# Patient Record
Sex: MALE | Race: WHITE | NOT HISPANIC OR LATINO | Employment: PART TIME | URBAN - NONMETROPOLITAN AREA
[De-identification: names, ages, dates, MRNs, and addresses within clinical notes are randomized per-mention and may not be internally consistent; named-entity substitution may affect disease eponyms.]

---

## 2020-03-14 ENCOUNTER — HOSPITAL ENCOUNTER (EMERGENCY)
Facility: HOSPITAL | Age: 35
Discharge: HOME | End: 2020-03-14
Attending: EMERGENCY MEDICINE | Admitting: EMERGENCY MEDICINE

## 2020-03-14 VITALS
RESPIRATION RATE: 16 BRPM | DIASTOLIC BLOOD PRESSURE: 96 MMHG | SYSTOLIC BLOOD PRESSURE: 150 MMHG | TEMPERATURE: 96.8 F | HEART RATE: 73 BPM | OXYGEN SATURATION: 100 %

## 2020-03-14 DIAGNOSIS — I83.899 BLEEDING FROM VARICOSE VEIN: Primary | ICD-10-CM

## 2020-03-14 PROCEDURE — 99281 EMR DPT VST MAYX REQ PHY/QHP: CPT

## 2020-03-14 PROCEDURE — 99281 EMR DPT VST MAYX REQ PHY/QHP: CPT | Mod: Z6 | Performed by: EMERGENCY MEDICINE

## 2020-03-14 ASSESSMENT — ENCOUNTER SYMPTOMS
ABDOMINAL PAIN: 0
SHORTNESS OF BREATH: 0
FEVER: 0

## 2020-03-14 NOTE — ED PROVIDER NOTES
History     Chief Complaint   Patient presents with     Varicose Vein     to right lower leg. states started squirting blood in the shower today. also c/o pain. no blood thinners or medications. denies injury. no bleeding at this time. sore noted to lower leg      HPI  Elvis Carver is a 34 year old male who presented to the ED with concerns about bleeding from the right ankle from a varicose vein.  While showering this morning, he noted bleeding from varicose vein on the right ankle.  He applied some tissue paper and came to the ED.  It was cleaned and pressure dressing applied at triage.  No active bleeding by the time patient was being  evaluated at the room.  He is not on any blood thinners.    Allergies:  No Known Allergies    Problem List:    There are no active problems to display for this patient.       Past Medical History:    No past medical history on file.    Past Surgical History:    No past surgical history on file.    Family History:    No family history on file.    Social History:  Marital Status:  Single [1]  Social History     Tobacco Use     Smoking status: Not on file   Substance Use Topics     Alcohol use: Not on file     Drug use: Not on file        Medications:    No current outpatient medications on file.        Review of Systems   Constitutional: Negative for fever.   Respiratory: Negative for shortness of breath.    Cardiovascular: Negative for chest pain.   Gastrointestinal: Negative for abdominal pain.   All other systems reviewed and are negative.      Physical Exam   BP: 150/96  Pulse: 73  Resp: 16  SpO2: 100 %      Physical Exam  Vitals signs and nursing note reviewed.   Constitutional:       General: He is not in acute distress.     Appearance: He is well-developed. He is not diaphoretic.   HENT:      Head: Normocephalic and atraumatic.   Eyes:      Pupils: Pupils are equal, round, and reactive to light.   Cardiovascular:      Rate and Rhythm: Normal rate and regular rhythm.       Heart sounds: Normal heart sounds.   Pulmonary:      Effort: Pulmonary effort is normal. No respiratory distress.      Breath sounds: Normal breath sounds. No stridor.   Abdominal:      General: Bowel sounds are normal. There is no distension.      Tenderness: There is no abdominal tenderness.   Musculoskeletal:         General: No tenderness or deformity.        Feet:    Neurological:      Mental Status: He is alert and oriented to person, place, and time.      Cranial Nerves: No cranial nerve deficit.         ED Course   Evaluated and pressure dressing applied to the right ankle varicose vein.    Procedures      No results found for this or any previous visit (from the past 24 hour(s)).    Medications - No data to display    Assessments & Plan (with Medical Decision Making)   Bleeding varicose vein: Right ankle varicose vein that was bleeding but controlled with pressure dressing.  Refer to general surgery for definitive treatment.   consult also made to establish care with a PCP.  Advised to maintain the current pressure dressing on the area for the next 48 hours.  Discharged home stable condition.    I have reviewed the nursing notes.    I have reviewed the findings, diagnosis, plan and need for follow up with the patient.    New Prescriptions    No medications on file       Final diagnoses:   Bleeding from varicose vein       3/14/2020   [facility]     Berhane lAdana MD  03/14/20 1129       Berhane Aldana MD  03/23/20 9256

## 2020-03-14 NOTE — ED NOTES
"Pt ambulatory to ED room 5 with c/o right lower leg pain. Pt states he was in the shower when his leg started \"shooting blood.\" Superficial vein noted to right lower leg. Small wound also noted to leg. No bleeding at this time.   "

## 2020-03-16 ENCOUNTER — TELEPHONE (OUTPATIENT)
Dept: BEHAVIORAL HEALTH | Facility: OTHER | Age: 35
End: 2020-03-16

## 2020-10-26 ENCOUNTER — ALLIED HEALTH/NURSE VISIT (OUTPATIENT)
Dept: FAMILY MEDICINE | Facility: OTHER | Age: 35
End: 2020-10-26
Payer: OTHER GOVERNMENT

## 2020-10-26 DIAGNOSIS — R05.9 COUGH: Primary | ICD-10-CM

## 2020-10-26 PROCEDURE — U0003 INFECTIOUS AGENT DETECTION BY NUCLEIC ACID (DNA OR RNA); SEVERE ACUTE RESPIRATORY SYNDROME CORONAVIRUS 2 (SARS-COV-2) (CORONAVIRUS DISEASE [COVID-19]), AMPLIFIED PROBE TECHNIQUE, MAKING USE OF HIGH THROUGHPUT TECHNOLOGIES AS DESCRIBED BY CMS-2020-01-R: HCPCS | Mod: ZL

## 2020-10-26 PROCEDURE — C9803 HOPD COVID-19 SPEC COLLECT: HCPCS

## 2020-10-26 PROCEDURE — 99207 PR NO CHARGE NURSE ONLY: CPT

## 2020-10-26 NOTE — PROGRESS NOTES
Patient swabbed for COVID-19 testing.  For cough.  April Bradford LPN on 10/26/2020 at 12:16 PM

## 2020-10-27 LAB
SARS-COV-2 RNA SPEC QL NAA+PROBE: NOT DETECTED
SPECIMEN SOURCE: NORMAL

## 2020-11-20 ENCOUNTER — ALLIED HEALTH/NURSE VISIT (OUTPATIENT)
Dept: FAMILY MEDICINE | Facility: OTHER | Age: 35
End: 2020-11-20
Attending: FAMILY MEDICINE
Payer: MEDICAID

## 2020-11-20 DIAGNOSIS — Z20.822 COVID-19 RULED OUT: Primary | ICD-10-CM

## 2020-11-20 LAB
LABORATORY COMMENT REPORT: NORMAL
SARS-COV-2 RNA SPEC QL NAA+PROBE: NEGATIVE
SARS-COV-2 RNA SPEC QL NAA+PROBE: NORMAL
SPECIMEN SOURCE: NORMAL
SPECIMEN SOURCE: NORMAL

## 2020-11-20 PROCEDURE — 99207 PR NO CHARGE NURSE ONLY: CPT

## 2020-11-20 PROCEDURE — C9803 HOPD COVID-19 SPEC COLLECT: HCPCS

## 2020-11-20 PROCEDURE — 87635 SARS-COV-2 COVID-19 AMP PRB: CPT | Mod: ZL | Performed by: FAMILY MEDICINE

## 2020-11-20 NOTE — PROGRESS NOTES
Patient swabbed for COVID-19 testing.  April Bradford LPN on 11/20/2020 at 2:12 PM    Covid rule out

## 2020-12-20 ENCOUNTER — HEALTH MAINTENANCE LETTER (OUTPATIENT)
Age: 35
End: 2020-12-20

## 2021-05-14 ENCOUNTER — IMMUNIZATION (OUTPATIENT)
Dept: FAMILY MEDICINE | Facility: OTHER | Age: 36
End: 2021-05-14
Attending: FAMILY MEDICINE
Payer: COMMERCIAL

## 2021-05-14 PROCEDURE — 0001A PR COVID VAC PFIZER DIL RECON 30 MCG/0.3 ML IM: CPT

## 2021-05-14 PROCEDURE — 91300 PR COVID VAC PFIZER DIL RECON 30 MCG/0.3 ML IM: CPT

## 2021-06-04 ENCOUNTER — IMMUNIZATION (OUTPATIENT)
Dept: FAMILY MEDICINE | Facility: OTHER | Age: 36
End: 2021-06-04
Attending: FAMILY MEDICINE
Payer: COMMERCIAL

## 2021-06-04 PROCEDURE — 91300 PR COVID VAC PFIZER DIL RECON 30 MCG/0.3 ML IM: CPT

## 2021-10-03 ENCOUNTER — HEALTH MAINTENANCE LETTER (OUTPATIENT)
Age: 36
End: 2021-10-03

## 2021-11-05 ENCOUNTER — ALLIED HEALTH/NURSE VISIT (OUTPATIENT)
Dept: FAMILY MEDICINE | Facility: OTHER | Age: 36
End: 2021-11-05
Attending: FAMILY MEDICINE
Payer: COMMERCIAL

## 2021-11-05 DIAGNOSIS — Z20.822 COVID-19 RULED OUT: Primary | ICD-10-CM

## 2021-11-05 PROCEDURE — U0005 INFEC AGEN DETEC AMPLI PROBE: HCPCS | Mod: ZL

## 2021-11-05 PROCEDURE — C9803 HOPD COVID-19 SPEC COLLECT: HCPCS

## 2021-11-06 LAB — SARS-COV-2 RNA RESP QL NAA+PROBE: NEGATIVE

## 2021-11-09 ENCOUNTER — OFFICE VISIT (OUTPATIENT)
Dept: FAMILY MEDICINE | Facility: OTHER | Age: 36
End: 2021-11-09
Payer: COMMERCIAL

## 2021-11-09 VITALS
HEART RATE: 82 BPM | OXYGEN SATURATION: 98 % | DIASTOLIC BLOOD PRESSURE: 88 MMHG | WEIGHT: 315 LBS | RESPIRATION RATE: 20 BRPM | SYSTOLIC BLOOD PRESSURE: 138 MMHG | TEMPERATURE: 97.7 F

## 2021-11-09 DIAGNOSIS — J02.9 SORE THROAT: ICD-10-CM

## 2021-11-09 DIAGNOSIS — J06.9 VIRAL URI WITH COUGH: Primary | ICD-10-CM

## 2021-11-09 DIAGNOSIS — R05.9 COUGH: ICD-10-CM

## 2021-11-09 LAB — GROUP A STREP BY PCR: NOT DETECTED

## 2021-11-09 PROCEDURE — 87651 STREP A DNA AMP PROBE: CPT | Mod: ZL | Performed by: PHYSICIAN ASSISTANT

## 2021-11-09 PROCEDURE — G0463 HOSPITAL OUTPT CLINIC VISIT: HCPCS

## 2021-11-09 PROCEDURE — 99213 OFFICE O/P EST LOW 20 MIN: CPT | Performed by: PHYSICIAN ASSISTANT

## 2021-11-09 RX ORDER — ALBUTEROL SULFATE 90 UG/1
2 AEROSOL, METERED RESPIRATORY (INHALATION) EVERY 4 HOURS PRN
Qty: 18 G | Refills: 1 | Status: SHIPPED | OUTPATIENT
Start: 2021-11-09

## 2021-11-09 RX ORDER — BENZONATATE 200 MG/1
200 CAPSULE ORAL 3 TIMES DAILY PRN
Qty: 30 CAPSULE | Refills: 1 | Status: SHIPPED | OUTPATIENT
Start: 2021-11-09 | End: 2022-10-17

## 2021-11-09 ASSESSMENT — PAIN SCALES - GENERAL: PAINLEVEL: SEVERE PAIN (7)

## 2021-11-09 NOTE — NURSING NOTE
Chief Complaint   Patient presents with     Cough   patient is here with a cough and sore throat. Multiple negative covid tests lately. Patient reports being light headed after he smokes a cigarette. Patient reports burning in his lungs.    Initial /88   Pulse 82   Temp 97.7  F (36.5  C) (Tympanic)   Resp 20   Wt (!) 154.7 kg (341 lb)   SpO2 98%  There is no height or weight on file to calculate BMI.  Medication Reconciliation: complete    FOOD SECURITY SCREENING QUESTIONS  Hunger Vital Signs:  Within the past 12 months we worried whether our food would run out before we got money to buy more. Never  Within the past 12 months the food we bought just didn't last and we didn't have money to get more. Never      Advanced Care Directive Reviewed    Augustin Nicole LPN

## 2021-11-09 NOTE — PATIENT INSTRUCTIONS
May use symptomatic care with tylenol or ibuprofen. Sudafed or mucinex work well for congestion. May use cough syrup or cough drops.  Using a humidifier works well to break up the congestion. You can also sleep propped up on a couple pillows to decrease symptoms at night.    Please take tylenol as needed up to 4 times daily.  Treat symptomatically with warm salt water gargles.  Lozenges, Tylenol, Advil or Aleve as needed. Frequent swallows of cool liquid.  Oatmeal coats the throat and some patients find it soothes the pain. Encouraged warm teas or fluids with honey.     If you have sinus congestion -  Use a Neti Pot/sinus flush (Moris Med Sinus Rinse) 3 times daily to irrigate sinuses/mucosal tissue.     Monitor for any fevers or chills. Return in 7-10 days if not feeling better. Please call clinic with any questions or concerns. Return to clinic with change/worsening of symptoms.   Encouraged fluids and rest.    Call 9-1-1 or go to the emergency room if you:  Have trouble breathing   Are drooling because you cannot swallow your saliva   Have swelling of the neck or tongue   Cannot move your neck or have trouble opening your mouth      COVID-19 Quarantine information:   Regardless of if you have been tested or not for COVID-19, if you develop possible signs or symptoms of COVID-19 please follow the following guidelines for when to discontinue your quarantine:  -       You've had no fever--and no medicine that reduces fever--for 1 full day (24 hours), and    -       Your other symptoms have resolved (gotten better). For example, your cough or breathing has improved, and   -       At least 10 days have passed since your symptoms started    Patient who have symptoms (cough, fever, or shortness of breath), need to isolate for 10 days from when symptoms started OR 24 hours after fever resolves (without fever reducing medications) AND improvement of respiratory symptoms (whichever is longer).       Isolate yourself at home  (in own room/own bathroom if possible)    Do Not allow any visitors    Do Not go to work or school    Do Not go to Hinduism,  centers, shopping, or other public places.    Do Not shake hands.    Avoid close and intimate contact with others (hugging, kissing).    Follow CDC recommendations for household cleaning of frequently touched services.      After the initial 10 days, continue to isolate yourself from household members as much as possible. To continue decrease the risk of community spread and exposure, you and any members of your household should limit activities in public for 14 days after starting home isolation.      You can reference the following CDC link for helpful home isolation/care tips:  https://www.cdc.gov/coronavirus/2019-ncov/downloads/10Things.pdf     Protect Others:    Cover Your Mouth and Nose with a mask, disposable tissue or wash cloth to avoid spreading germs to others.    Wash your hands and face frequently with soap and water     Call Back If: Breathing difficulty develops or you become worse.     For more information about COVID19 and options for caring for yourself at home, please visit the CDC website at https://www.cdc.gov/coronavirus/2019-ncov/about/steps-when-sick.html  For more options for care at [facility], please visit our website at [email or web address]

## 2021-11-09 NOTE — LETTER
November 9, 2021      Elvis Carver  [address]  [facility] [place] [address]        To Whom It May Concern:    Elvis Carver was seen in our clinic. Please excuse from work from 11/5-11/14/2021. He may return to work once he is fever free for 24 hours and starting to feel better.  He was diagnosed with probable COVID-19 on 11/9/2021 with current symptoms and close ill family contact members.  He may return to work without restrictions.      Sincerely,        Amber Tobin PA-C

## 2021-11-09 NOTE — PROGRESS NOTES
Nursing Notes:   Augustin Nicole LPN  11/9/2021  9:49 AM  Signed  Chief Complaint   Patient presents with     Cough   patient is here with a cough and sore throat. Multiple negative covid tests lately. Patient reports being light headed after he smokes a cigarette. Patient reports burning in his lungs.    Initial /88   Pulse 82   Temp 97.7  F (36.5  C) (Tympanic)   Resp 20   Wt (!) 154.7 kg (341 lb)   SpO2 98%  There is no height or weight on file to calculate BMI.  Medication Reconciliation: complete    FOOD SECURITY SCREENING QUESTIONS  Hunger Vital Signs:  Within the past 12 months we worried whether our food would run out before we got money to buy more. Never  Within the past 12 months the food we bought just didn't last and we didn't have money to get more. Never      Advanced Care Directive Reviewed    Augustin Nicole LPN        HPI:    Elvis Carver is a 36 year old male who presents for coughing.  Patient states that he has a cough and sore throat.  He had a Covid-19 test completed on 11/5 that was negative.  He also had a rapid Covid-19 test completed in [place] yesterday that was negative.  He does state that he has multiple Balbir-19 exposures with his household.  5 out of 6 people have tested positive for Covid-19 in his house.  His children have had more stomach upset and diarrhea with the infection.  Patient reports that he is currently having a sore throat and cough.  Lightheaded after he smokes a cigarette.  He also notices a burning in his lungs after he smokes a cigarette.  Has been using Mucinex as needed for the cough which is helping.  Patient symptoms started on 11/5/2021.  He has had a little diarrhea and no appetite.  No ear pain or sinus pain.  Having headaches.  Runny nose comes and goes.  Having some gurgling in his upper chest with laying down.  His lungs burn when he smokes a cigarette.  Felt hot and cold the first night however none since then.  Possible change in taste  initially however none since then.  No change in smell.  Keeping food and fluids down.  No dehydration concerns.    Past Medical History:   Diagnosis Date     No pertinent past medical history        Past Surgical History:   Procedure Laterality Date     NO HISTORY OF SURGERY         History reviewed. No pertinent family history.    Social History     Tobacco Use     Smoking status: Current Every Day Smoker     Packs/day: 0.50     Smokeless tobacco: Never Used     Tobacco comment: smokes a pipe   Substance Use Topics     Alcohol use: Yes     Comment: rare       Current Outpatient Medications   Medication Sig Dispense Refill     albuterol (PROAIR HFA/PROVENTIL HFA/VENTOLIN HFA) 108 (90 Base) MCG/ACT inhaler Inhale 2 puffs into the lungs every 4 hours as needed for shortness of breath / dyspnea or wheezing 18 g 1     benzonatate (TESSALON) 200 MG capsule Take 1 capsule (200 mg) by mouth 3 times daily as needed for cough 30 capsule 1       No Known Allergies    REVIEW OF SYSTEMS:  Refer to HPI.    EXAM:   Vitals:    /88   Pulse 82   Temp 97.7  F (36.5  C) (Tympanic)   Resp 20   Wt (!) 154.7 kg (341 lb)   SpO2 98%     General Appearance: Pleasant, alert, appropriate appearance for age. No acute distress  Ear Exam: Normal TM's bilaterally, grey, translucent, bony landmarks appreciated.   Left/Right TM: Effusion is not present. TM is not bulging. There is no pus appreciated.    Normal auditory canals and external ears. Non-tender.  OroPharynx Exam:  Erythematous posterior pharynx with no exudates. No sinus pain upon palpation of frontal, ethmoid, and maxillary sinuses.  Chest/Respiratory Exam: Normal chest wall and respirations. Clear to auscultation.  No wheezing or crackling appreciated.  No retractions appreciated.  Cardiovascular Exam: Regular rate and rhythm. S1, S2, no murmur, click, gallop, or rubs.  Lymphatic Exam: ACLN.  Skin: no rash or abnormalities  Psychiatric Exam: Alert and oriented - appropriate  affect.    PHQ Depression Screen  No flowsheet data found.    LABS:    Results for orders placed or performed in visit on 11/09/21   Group A Streptococcus PCR Throat Swab     Status: Normal    Specimen: Throat; Swab   Result Value Ref Range    Group A strep by PCR Not Detected Not Detected    Narrative    The Xpert Xpress Strep A test, performed on the [employer], is a rapid, qualitative in vitro diagnostic test for the detection of Streptococcus pyogenes (Group A ß-hemolytic Streptococcus, Strep A) in throat swab specimens from patients with signs and symptoms of pharyngitis. The Xpert Xpress Strep A test can be used as an aid in the diagnosis of Group A Streptococcal pharyngitis. The assay is not intended to monitor treatment for Group A Streptococcus infections. The Xpert Xpress Strep A test utilizes an automated real-time polymerase chain reaction (PCR) to detect Streptococcus pyogenes DNA.         ASSESSMENT AND PLAN:      ICD-10-CM    1. Viral URI with cough  J06.9    2. Sore throat  J02.9 Group A Streptococcus PCR Throat Swab   3. Cough  R05.9 benzonatate (TESSALON) 200 MG capsule     albuterol (PROAIR HFA/PROVENTIL HFA/VENTOLIN HFA) 108 (90 Base) MCG/ACT inhaler     Completed rapid strep test which was negative.  Exam is stable.  Vital signs are in the normal range.  Symptoms are viral.  No antibiotics are warranted at this time.  Symptoms likely due to Covid-19 with multiple exposures in the household.  Gave warning signs and symptoms.  Encouraged increased fluids with electrolytes.  Patient was given Tessalon Perles and an albuterol inhaler to use as needed for symptomatic relief.  Patient was given a work note.  Gave patient education.  Recheck if symptoms are not coming down or worsening if needed.  Gave warning signs and symptoms.  Encourage quarantining for 10 to 14 days until symptoms are coming down.  Return to clinic or emergency room if symptoms are changing or worsening.  Highly  stressed the need to quit smoking.    Patient Instructions   May use symptomatic care with tylenol or ibuprofen. Sudafed or mucinex work well for congestion. May use cough syrup or cough drops.  Using a humidifier works well to break up the congestion. You can also sleep propped up on a couple pillows to decrease symptoms at night.    Please take tylenol as needed up to 4 times daily.  Treat symptomatically with warm salt water gargles.  Lozenges, Tylenol, Advil or Aleve as needed. Frequent swallows of cool liquid.  Oatmeal coats the throat and some patients find it soothes the pain. Encouraged warm teas or fluids with honey.     If you have sinus congestion -  Use a Neti Pot/sinus flush (Moris Med Sinus Rinse) 3 times daily to irrigate sinuses/mucosal tissue.     Monitor for any fevers or chills. Return in 7-10 days if not feeling better. Please call clinic with any questions or concerns. Return to clinic with change/worsening of symptoms.   Encouraged fluids and rest.    Call 9-1-1 or go to the emergency room if you:  Have trouble breathing   Are drooling because you cannot swallow your saliva   Have swelling of the neck or tongue   Cannot move your neck or have trouble opening your mouth      COVID-19 Quarantine information:   Regardless of if you have been tested or not for COVID-19, if you develop possible signs or symptoms of COVID-19 please follow the following guidelines for when to discontinue your quarantine:  -       You've had no fever--and no medicine that reduces fever--for 1 full day (24 hours), and    -       Your other symptoms have resolved (gotten better). For example, your cough or breathing has improved, and   -       At least 10 days have passed since your symptoms started    Patient who have symptoms (cough, fever, or shortness of breath), need to isolate for 10 days from when symptoms started OR 24 hours after fever resolves (without fever reducing medications) AND improvement of respiratory  symptoms (whichever is longer).       Isolate yourself at home (in own room/own bathroom if possible)    Do Not allow any visitors    Do Not go to work or school    Do Not go to Jehovah's witness,  centers, shopping, or other public places.    Do Not shake hands.    Avoid close and intimate contact with others (hugging, kissing).    Follow CDC recommendations for household cleaning of frequently touched services.      After the initial 10 days, continue to isolate yourself from household members as much as possible. To continue decrease the risk of community spread and exposure, you and any members of your household should limit activities in public for 14 days after starting home isolation.      You can reference the following CDC link for helpful home isolation/care tips:  https://www.cdc.gov/coronavirus/2019-ncov/downloads/10Things.pdf     Protect Others:    Cover Your Mouth and Nose with a mask, disposable tissue or wash cloth to avoid spreading germs to others.    Wash your hands and face frequently with soap and water     Call Back If: Breathing difficulty develops or you become worse.     For more information about COVID19 and options for caring for yourself at home, please visit the CDC website at https://www.cdc.gov/coronavirus/2019-ncov/about/steps-when-sick.html  For more options for care at [facility], please visit our website at [email or web address]           Amber Tobin PA-C ..................11/9/2021 9:50 AM

## 2021-11-28 ENCOUNTER — HEALTH MAINTENANCE LETTER (OUTPATIENT)
Age: 36
End: 2021-11-28

## 2022-01-23 ENCOUNTER — HEALTH MAINTENANCE LETTER (OUTPATIENT)
Age: 37
End: 2022-01-23

## 2022-01-24 ENCOUNTER — ALLIED HEALTH/NURSE VISIT (OUTPATIENT)
Dept: FAMILY MEDICINE | Facility: OTHER | Age: 37
End: 2022-01-24
Attending: FAMILY MEDICINE
Payer: COMMERCIAL

## 2022-01-24 DIAGNOSIS — Z20.822 EXPOSURE TO 2019 NOVEL CORONAVIRUS: Primary | ICD-10-CM

## 2022-01-24 PROCEDURE — C9803 HOPD COVID-19 SPEC COLLECT: HCPCS

## 2022-01-24 PROCEDURE — U0005 INFEC AGEN DETEC AMPLI PROBE: HCPCS | Mod: ZL

## 2022-01-24 NOTE — PROGRESS NOTES
Patient here for Covid Testing. Rule out- Exposure, no symptoms.    Irma Hayes MA on 1/24/2022 at 11:31 AM

## 2022-01-25 ENCOUNTER — MYC MEDICAL ADVICE (OUTPATIENT)
Dept: FAMILY MEDICINE | Facility: OTHER | Age: 37
End: 2022-01-25
Payer: COMMERCIAL

## 2022-01-25 LAB — SARS-COV-2 RNA RESP QL NAA+PROBE: DETECTED

## 2022-04-25 ENCOUNTER — HOSPITAL ENCOUNTER (EMERGENCY)
Facility: HOSPITAL | Age: 37
Discharge: HOME | End: 2022-04-25
Attending: NURSE PRACTITIONER | Admitting: NURSE PRACTITIONER
Payer: OTHER MISCELLANEOUS

## 2022-04-25 ENCOUNTER — APPOINTMENT (OUTPATIENT)
Dept: GENERAL RADIOLOGY | Facility: HOSPITAL | Age: 37
End: 2022-04-25
Attending: NURSE PRACTITIONER
Payer: OTHER MISCELLANEOUS

## 2022-04-25 VITALS
DIASTOLIC BLOOD PRESSURE: 82 MMHG | SYSTOLIC BLOOD PRESSURE: 112 MMHG | HEART RATE: 72 BPM | OXYGEN SATURATION: 97 % | RESPIRATION RATE: 20 BRPM | TEMPERATURE: 99 F

## 2022-04-25 DIAGNOSIS — S46.911A MUSCLE STRAIN OF RIGHT UPPER ARM, INITIAL ENCOUNTER: ICD-10-CM

## 2022-04-25 PROCEDURE — 73060 X-RAY EXAM OF HUMERUS: CPT | Mod: RT

## 2022-04-25 PROCEDURE — 99213 OFFICE O/P EST LOW 20 MIN: CPT | Performed by: NURSE PRACTITIONER

## 2022-04-25 PROCEDURE — G0463 HOSPITAL OUTPT CLINIC VISIT: HCPCS

## 2022-04-25 RX ORDER — HYDROCODONE BITARTRATE AND ACETAMINOPHEN 5; 325 MG/1; MG/1
1 TABLET ORAL EVERY 6 HOURS PRN
Qty: 18 TABLET | Refills: 0 | Status: SHIPPED | OUTPATIENT
Start: 2022-04-25 | End: 2022-04-28

## 2022-04-25 RX ORDER — IBUPROFEN 800 MG/1
800 TABLET, FILM COATED ORAL EVERY 8 HOURS PRN
Qty: 60 TABLET | Refills: 0 | Status: SHIPPED | OUTPATIENT
Start: 2022-04-25 | End: 2022-05-03

## 2022-04-25 ASSESSMENT — ENCOUNTER SYMPTOMS
NUMBNESS: 0
FEVER: 0
COUGH: 0
MYALGIAS: 1
ARTHRALGIAS: 1
PSYCHIATRIC NEGATIVE: 1

## 2022-04-25 NOTE — ED TRIAGE NOTES
Pt fell into a hole on a deck, catching himself using a railing with his right arm. Pain since catching himself.   Pt can move shoulder joint and lower arm pulse and color and temp WDL. Pt states it is painful to life up.   Ice offered and denied

## 2022-04-25 NOTE — DISCHARGE INSTRUCTIONS
1.Ace bandage to right upper arm for support. Rest arm til seen by Ortho    2. Sling to arm for support til seen by ortho    3. Lortab   5/325mg  1 tab every 4-6 hours  for worst pain..  Ibuprofen 800mg  3 times a day  with food.   4. Referral to [facility]    Call their office for appt.  [phone]

## 2022-04-25 NOTE — Clinical Note
Elvis Carver was seen and treated in our emergency department on 4/25/2022.  He may return to work on 05/09/2022.  Until seen by Ortho       If you have any questions or concerns, please don't hesitate to call.      Warner Rivas, NP

## 2022-04-25 NOTE — ED PROVIDER NOTES
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 Felt pop to back of right upper arm. Pain in posterior upper arm   to straighten out right lower arm.Can move upper arm out somewhat. Shoulder itself   does not hurt.    No obvious lumps or mass noted.  No numbness in hand  Or fingers.      Allergies:  No Known Allergies    Problem List:    There are no problems to display for this patient.       Past Medical History:    Past Medical History:   Diagnosis Date     No pertinent past medical history        Past Surgical History:    Past Surgical History:   Procedure Laterality Date     NO HISTORY OF SURGERY         Family History:    History reviewed. No pertinent family history.    Social History:  Marital Status:  Single [1]  Social History     Tobacco Use     Smoking status: Current Every Day Smoker     Packs/day: 0.50     Smokeless tobacco: Never Used     Tobacco comment: smokes a pipe   Vaping Use     Vaping Use: Never used   Substance Use Topics     Alcohol use: Yes     Comment: rare     Drug use: Yes     Types: Marijuana        Medications:    albuterol (PROAIR HFA/PROVENTIL HFA/VENTOLIN HFA) 108 (90 Base) MCG/ACT inhaler  benzonatate (TESSALON) 200 MG capsule  HYDROcodone-acetaminophen (NORCO) 5-325 MG tablet  ibuprofen (ADVIL/MOTRIN) 800 MG tablet          Review of Systems   Constitutional: Negative for fever.   Respiratory: Negative for cough.    Musculoskeletal: Positive for arthralgias and myalgias.        Pain in posterior upper arm, son when bends lower arm ,     Skin: Negative for rash.   Neurological: Negative for numbness.   Psychiatric/Behavioral: Negative.        Physical Exam   BP: 112/82  Pulse: 72  Temp: 99  F (37.2  C)  Resp: 20  SpO2: 97 %      Physical Exam  Constitutional:       Comments: Has pain in upper arm      Musculoskeletal:         General: Tenderness present. No swelling or deformity.      Comments: Pain with straightening out lower arm in Upper posterior arm.  No bulge noted.  Radial pulse and CMS intact to fingers.  Has to use left arm to lift right arm.     Skin:     General: Skin is warm and dry.      Capillary Refill: Capillary refill takes less than 2 seconds.      Findings: No bruising or erythema.   Neurological:      General: No focal deficit present.      Mental Status: He is alert and oriented to person, place, and time.      Sensory: No sensory deficit.      Motor: Weakness present.      Comments: CMS intact.     Psychiatric:         Mood and Affect: Mood normal.         Behavior: Behavior normal.         ED Course                 Procedures       Ace to upper arm      Splint placed.             Results for orders placed or performed during the hospital encounter of 04/25/22 (from the past 24 hour(s))   Humerus XR, G/E 2 views, right    Narrative    Exam: XR HUMERUS RIGHT G/E 2 VIEWS     History:Male, age 36 years, Pt fell on shoulder and landed on a  railing    Comparison:  None    Technique: Two views are submitted    Findings: Bones are normally mineralized. No evidence of acute or  subacute fracture.  No evidence of dislocation.           Impression    Impression:  No evidence of acute or subacute bony abnormality.    KERLINE CRAWFORD MD         SYSTEM ID:  [id number]       Medications - No data to display    Assessments & Plan (with Medical Decision Making)     I have reviewed the nursing notes.    I have reviewed the findings, diagnosis, plan and need for follow up with the patient.  Concern re:  Pulled bicep off mooring??    New Prescriptions    HYDROCODONE-ACETAMINOPHEN (NORCO) 5-325 MG TABLET    Take 1 tablet by mouth every 6 hours as needed for pain    IBUPROFEN (ADVIL/MOTRIN) 800 MG TABLET    Take 1 tablet (800 mg) by mouth every 8 hours as needed for moderate pain       Final diagnoses:    Muscle strain of right upper arm, initial encounter   ASSESSMENT / PLAN:  (S44.859S) Muscle strain of right upper arm, initial encounter  Comment:   Plan:   Concern re:  Pulled bicep off mooring??    1.Ace bandage to right upper arm for support. Rest arm til seen by Ortho    2. Sling to arm for support til seen by ortho    3. Lortab   5/325mg  1 tab every 4-6 hours  for worst pain..  Ibuprofen 800mg  3 times a day  with food.   4. Referral to [facility]    Call their office for appt.  [phone]   4/25/2022   [facility]     Warner Rivas, TONYA  04/25/22 1631       Warner Rivas NP  04/25/22 1705

## 2022-04-25 NOTE — ED TRIAGE NOTES
Pt presents with right shoulder pain from fall. Pt has limited ROM with shoulder. Pt rates pain 4/10 with movement.

## 2022-08-08 ENCOUNTER — APPOINTMENT (OUTPATIENT)
Dept: OCCUPATIONAL MEDICINE | Facility: OTHER | Age: 37
End: 2022-08-08

## 2022-08-08 PROCEDURE — 99000 SPECIMEN HANDLING OFFICE-LAB: CPT

## 2022-09-04 ENCOUNTER — HEALTH MAINTENANCE LETTER (OUTPATIENT)
Age: 37
End: 2022-09-04

## 2022-10-17 ENCOUNTER — OFFICE VISIT (OUTPATIENT)
Dept: FAMILY MEDICINE | Facility: OTHER | Age: 37
End: 2022-10-17
Attending: NURSE PRACTITIONER
Payer: COMMERCIAL

## 2022-10-17 ENCOUNTER — HOSPITAL ENCOUNTER (OUTPATIENT)
Dept: GENERAL RADIOLOGY | Facility: OTHER | Age: 37
Discharge: HOME | End: 2022-10-17
Attending: STUDENT IN AN ORGANIZED HEALTH CARE EDUCATION/TRAINING PROGRAM
Payer: COMMERCIAL

## 2022-10-17 VITALS
OXYGEN SATURATION: 98 % | WEIGHT: 315 LBS | SYSTOLIC BLOOD PRESSURE: 130 MMHG | RESPIRATION RATE: 20 BRPM | HEART RATE: 68 BPM | DIASTOLIC BLOOD PRESSURE: 82 MMHG | TEMPERATURE: 98.1 F

## 2022-10-17 DIAGNOSIS — R05.2 SUBACUTE COUGH: Primary | ICD-10-CM

## 2022-10-17 DIAGNOSIS — R05.2 SUBACUTE COUGH: ICD-10-CM

## 2022-10-17 DIAGNOSIS — J06.9 VIRAL URI WITH COUGH: ICD-10-CM

## 2022-10-17 PROCEDURE — 99213 OFFICE O/P EST LOW 20 MIN: CPT | Performed by: STUDENT IN AN ORGANIZED HEALTH CARE EDUCATION/TRAINING PROGRAM

## 2022-10-17 PROCEDURE — G0463 HOSPITAL OUTPT CLINIC VISIT: HCPCS

## 2022-10-17 PROCEDURE — 71046 X-RAY EXAM CHEST 2 VIEWS: CPT

## 2022-10-17 RX ORDER — BENZONATATE 200 MG/1
200 CAPSULE ORAL 3 TIMES DAILY PRN
Qty: 30 CAPSULE | Refills: 1 | Status: SHIPPED | OUTPATIENT
Start: 2022-10-17

## 2022-10-17 ASSESSMENT — PAIN SCALES - GENERAL: PAINLEVEL: NO PAIN (0)

## 2022-10-17 NOTE — PROGRESS NOTES
Chief Complaint   Patient presents with     URI     Symptoms for 3-weeks, coughing and can't sleep, congestion       Initial /82 (BP Location: Right arm, Patient Position: Sitting, Cuff Size: Adult Large)   Pulse 68   Temp 98.1  F (36.7  C) (Tympanic)   Resp 20   Wt 149.2 kg (329 lb)   SpO2 98%  There is no height or weight on file to calculate BMI.      Medication Reconciliation: complete    FOOD SECURITY SCREENING QUESTIONS:      The next two questions are to help us understand your food security.  If you are feeling you need any assistance in this area, we have resources available to support you today.    Hunger Vital Signs:  Within the past 12 months we worried whether our food would run out before we got money to buy more. Never  Within the past 12 months the food we bought just didn't last and we didn't have money to get more. Never    Renetta Chin LPN....................  10/17/2022   9:38 AM

## 2022-10-17 NOTE — PROGRESS NOTES
Mr. Carver is a 37 year old male who presents to the clinic for Cough symptoms.     HPI     Ongoing cough for 3 weeks.   Toddlers at home  No current fevers or chills.     Nonproductive.  Current active smoker.  Smokes approximately two thirds of a pack per day as well as a pipe.  Down from 1 pack/day.        Review of Systems     Reviewed and updated as needed this visit by Provider                   EXAM:   Vitals:    10/17/22 0937   BP: 130/82   BP Location: Right arm   Patient Position: Sitting   Cuff Size: Adult Large   Pulse: 68   Resp: 20   Temp: 98.1  F (36.7  C)   TempSrc: Tympanic   SpO2: 98%   Weight: 149.2 kg (329 lb)         BP Readings from Last 3 Encounters:   10/17/22 130/82   04/25/22 112/82   11/09/21 138/88      Wt Readings from Last 3 Encounters:   10/17/22 149.2 kg (329 lb)   11/09/21 (!) 154.7 kg (341 lb)      There is no height or weight on file to calculate BMI.     Physical Exam   General: Pleasant 37-year-old man sitting clinic no acute distress  Pulmonary: Occasional scattered inspiratory crackles resolved with several deep breaths    INVESTIGATIONS:  - Labs reviewed in Epic     ASSESSMENT AND PLAN:    ICD-10-CM    1. Subacute cough  R05.2 XR Chest 2 Views     benzonatate (TESSALON) 200 MG capsule      2. Cough  R05.9       3. Viral URI with cough  J06.9 benzonatate (TESSALON) 200 MG capsule          Assessment/Plan: Patient with ongoing dry nonproductive cough for 3 weeks after a viral illness and no other symptoms at this time.  Chest x-ray obtained which shows no pneumonia.  Recommend supportive cares with ibuprofen and Tylenol as needed as well as DayQuil and NyQuil for cough.  He was given a prescription for Tessalon Perles and instructed to use his inhaler that he has at home 2-3 times daily.        Electronically signed by:  Hany Farrell MD on 10/17/2022  Internal Medicine  [facility]

## 2022-12-25 NOTE — ED AVS SNAPSHOT
[facility]  [address] [place] [address]  Phone:  [phone]                                    Elvis Carver   MRN: [id number]    Department:  [facility]   Date of Visit:  3/14/2020           After Visit Summary Signature Page    I have received my discharge instructions, and my questions have been answered. I have discussed any challenges I see with this plan with the nurse or doctor.    ..........................................................................................................................................  Patient/Patient Representative Signature      ..........................................................................................................................................  Patient Representative Print Name and Relationship to Patient    ..................................................               ................................................  Date                                   Time    ..........................................................................................................................................  Reviewed by Signature/Title    ...................................................              ..............................................  Date                                               Time          22EPIC Rev 08/18        complains of pain/discomfort

## 2023-04-15 ENCOUNTER — HOSPITAL ENCOUNTER (OUTPATIENT)
Dept: GENERAL RADIOLOGY | Facility: OTHER | Age: 38
Discharge: HOME | End: 2023-04-15
Attending: NURSE PRACTITIONER
Payer: COMMERCIAL

## 2023-04-15 ENCOUNTER — OFFICE VISIT (OUTPATIENT)
Dept: FAMILY MEDICINE | Facility: OTHER | Age: 38
End: 2023-04-15
Payer: COMMERCIAL

## 2023-04-15 VITALS
RESPIRATION RATE: 12 BRPM | BODY MASS INDEX: 41.75 KG/M2 | WEIGHT: 315 LBS | TEMPERATURE: 97.4 F | SYSTOLIC BLOOD PRESSURE: 122 MMHG | DIASTOLIC BLOOD PRESSURE: 78 MMHG | OXYGEN SATURATION: 98 % | HEART RATE: 56 BPM | HEIGHT: 73 IN

## 2023-04-15 DIAGNOSIS — E66.01 MORBID OBESITY (H): ICD-10-CM

## 2023-04-15 DIAGNOSIS — M43.10 PARS DEFECT WITH SPONDYLOLISTHESIS: ICD-10-CM

## 2023-04-15 DIAGNOSIS — M54.50 ACUTE BILATERAL LOW BACK PAIN WITHOUT SCIATICA: Primary | ICD-10-CM

## 2023-04-15 PROCEDURE — 72100 X-RAY EXAM L-S SPINE 2/3 VWS: CPT

## 2023-04-15 PROCEDURE — 250N000011 HC RX IP 250 OP 636: Performed by: NURSE PRACTITIONER

## 2023-04-15 PROCEDURE — G0463 HOSPITAL OUTPT CLINIC VISIT: HCPCS | Mod: 25

## 2023-04-15 PROCEDURE — 99214 OFFICE O/P EST MOD 30 MIN: CPT | Performed by: NURSE PRACTITIONER

## 2023-04-15 PROCEDURE — G0463 HOSPITAL OUTPT CLINIC VISIT: HCPCS

## 2023-04-15 PROCEDURE — 96372 THER/PROPH/DIAG INJ SC/IM: CPT | Performed by: NURSE PRACTITIONER

## 2023-04-15 RX ORDER — METHOCARBAMOL 500 MG/1
500-1000 TABLET, FILM COATED ORAL 4 TIMES DAILY PRN
Qty: 270 TABLET | Refills: 3 | Status: SHIPPED | OUTPATIENT
Start: 2023-04-15

## 2023-04-15 RX ORDER — KETOROLAC TROMETHAMINE 30 MG/ML
60 INJECTION, SOLUTION INTRAMUSCULAR; INTRAVENOUS ONCE
Status: COMPLETED | OUTPATIENT
Start: 2023-04-15 | End: 2023-04-15

## 2023-04-15 RX ORDER — SENNOSIDES 8.6 MG
1300 CAPSULE ORAL EVERY 8 HOURS PRN
Qty: 270 TABLET | Refills: 1 | Status: SHIPPED | OUTPATIENT
Start: 2023-04-15

## 2023-04-15 RX ADMIN — KETOROLAC TROMETHAMINE 60 MG: 30 INJECTION, SOLUTION INTRAMUSCULAR at 13:17

## 2023-04-15 ASSESSMENT — PAIN SCALES - GENERAL: PAINLEVEL: SEVERE PAIN (6)

## 2023-04-15 NOTE — NURSING NOTE
Chief Complaint   Patient presents with     Back Pain     Low back   Patient woke up yesterday am with back pain and worsened as the day went on . Cannot relate to any activity or injury.    Patient treating with tylenol and ibuprofen  Used icy hot on back last night with some relief    Advanced Care Planning on file? no    Medication Review Completed: complete    FOOD SECURITY SCREENING QUESTIONS:    The next two questions are to help us understand your food security.  If you are feeling you need any assistance in this area, we have resources available to support you today.    Hunger Vital Signs:  Within the past 12 months we worried whether our food would run out before we got money to buy more. Never  Within the past 12 months the food we bought just didn't last and we didn't have money to get more. Never    Maryana Mariano LPN

## 2023-04-15 NOTE — LETTER
April 15, 2023      Elvis Carver  [address]  [facility] [facility] [address]        To Whom It May Concern:    Elvis Carver was seen in our clinic. He may return to work with the following: limited to light duty - lifting no greater than 10 pounds, no bending/stooping, no repetitive motions, and standing limited to 4 hours without a break until he has completed 4 sessions of Physical Therapy.    If unable to accommodate light duty restrictions, please notify me.    Sincerely,        CASI Dickerson, AGNP-C  Internal Medicine - Unit 3  04/15/2023 1:03 PM

## 2023-04-15 NOTE — PROGRESS NOTES
"HPI:    Elvis Carver is a 37 year old male who [facility] for low back pain. Patient woke up yesterday am with back pain and worsened as the day went on. He cannot relate to any activity or injury. He has no prior history of back injury. He works at [employer] as a . Patient treating with Tylenol 650 mg and ibuprofen 800 mg this morning. He used icy hot on back last night with some relief. He reports he had difficulty getting out of bed this morning.  Denies any changes with bowel/bladder.     ROS:  Refer to HPI    /78 (BP Location: Right arm, Patient Position: Sitting, Cuff Size: Adult Large)   Pulse 56   Temp 97.4  F (36.3  C) (Tympanic)   Resp 12   Ht 1.854 m (6' 1\")   Wt 148.8 kg (328 lb)   SpO2 98%   BMI 43.27 kg/m      EXAM:  General Appearance: Well appearing male, appropriate appearance for age. No acute distress  Respiratory: normal chest wall and respirations.  Normal effort.  Clear to auscultation bilaterally, no wheezing, crackles or rhonchi.  No increased work of breathing.  No cough appreciated.  Cardiac: RRR with no murmurs  Musculoskeletal:  Equal movement of bilateral upper extremities.  Equal movement of bilateral lower extremities.  Normal gait.  Unable to rotate side to side due to increased pain.  Dermatological: no rashes noted of exposed skin  Psychological: normal affect, alert, oriented, and pleasant.     Diagnostics:  Results for orders placed or performed in visit on 04/15/23   XR Lumbar Spine 2/3 Views     Status: None    Narrative    XR LUMBAR SPINE 2/3 VIEWS    HISTORY: Acute bilateral low back pain without sciatica .    COMPARISON: None.    TECHNIQUE: 3 views of the lumbosacral spine.    FINDINGS:    Bilateral L5 pars defects are present. There is 6 mm of grade 1  anterolisthesis of L5 on S1. Lumbar vertebral body heights are  maintained.        Impression    IMPRESSION:     Grade 1 spondylolisthesis of L5 on S1 on the basis of bilateral L5  pars " defects.      TIKI GALVEZ MD         SYSTEM ID:  [id number]       ASSESSMENT/PLAN:  Elvis was seen today for back pain.    Diagnoses and all orders for this visit:    Acute bilateral low back pain without sciatica  Pars defect with spondylolisthesis  Discussed with patient the process of working up back pain treatment plan. Encouraged him to call for PHYSICAL THERAPY appointment asap and get started with that. Discussed that once he has done some PHYSICAL THERAPY, and is seeing no improvement, we can get further imaging/referral to Dr. Flynn, Neurosurgeon. Work letter given as I want him on light duty restrictions. If they are unable to accommodate these restrictions, I told him to follow up in clinic. Information given.  -     XR Lumbar Spine 2/3 Views, results as above  -     ketorolac (TORADOL) injection 60 mg, given in clinic today for pain, inflammation.  -     acetaminophen (TYLENOL) 650 MG CR tablet; Take 2 tablets (1,300 mg) by mouth every 8 hours as needed for mild pain or fever  -     methocarbamol (ROBAXIN) 500 MG tablet; Take 1-2 tablets (500-1,000 mg) by mouth 4 times daily as needed for muscle spasms  -     Physical Therapy Referral; Future    Morbid obesity (H)  Discussed the importance of weight loss for overall health and improvement with posture and decrease of back pain.    Discussed warning signs/symptoms indicative of need to follow-up.    Follow up with PCP or ED if symptoms persist or worsen or concerns.    I explained my diagnostic considerations and recommendations to the patient, who voiced understanding and agreement with the treatment plan. All questions were answered. We discussed potential side effects of any prescribed or recommended therapies, as well as expectations for response to treatments.    CASI Dickerson, AGNP-C  [employer]  04/15/2023 3:19 PM

## 2023-04-15 NOTE — PATIENT INSTRUCTIONS
Pars defect describes a crack or fracture in your pars interarticularis which is a small connecting bone in your lower spine. A defect in the pars interarticularis is a common cause of lower back pain and can result in pain when walking and stiffness in the lumbar region. A pars defect can also cause radiated pain in the buttock, thighs, or the backs of your legs. Pars defect is closely connected with two conditions that cause pain in the lower back - spondylolysis and spondylolisthesis. These painful lower back conditions can be the result of overusing the lower back, trauma when playing sports or due to genetic factors. Usually, to help ease the pain that pars defect causes, doctors recommend a course of physical therapy. This is to relax the muscles and strengthen the core and lower back area. Very often, heat compresses applied to the lower back can help to alleviate lumbar pain and increase flexibility. Strengthening the back muscles and using a correct posture are usually the best ways to prevent pain and discomfort caused by pars defect.

## 2023-04-26 ENCOUNTER — OFFICE VISIT (OUTPATIENT)
Dept: FAMILY MEDICINE | Facility: OTHER | Age: 38
End: 2023-04-26
Attending: PHYSICIAN ASSISTANT
Payer: COMMERCIAL

## 2023-04-26 VITALS
WEIGHT: 315 LBS | OXYGEN SATURATION: 97 % | HEART RATE: 67 BPM | RESPIRATION RATE: 22 BRPM | TEMPERATURE: 97.6 F | BODY MASS INDEX: 43.67 KG/M2 | DIASTOLIC BLOOD PRESSURE: 88 MMHG | SYSTOLIC BLOOD PRESSURE: 124 MMHG

## 2023-04-26 DIAGNOSIS — M54.42 ACUTE BILATERAL LOW BACK PAIN WITH LEFT-SIDED SCIATICA: Primary | ICD-10-CM

## 2023-04-26 PROCEDURE — 99213 OFFICE O/P EST LOW 20 MIN: CPT | Performed by: PHYSICIAN ASSISTANT

## 2023-04-26 PROCEDURE — G0463 HOSPITAL OUTPT CLINIC VISIT: HCPCS

## 2023-04-26 RX ORDER — METHYLPREDNISOLONE 4 MG
TABLET, DOSE PACK ORAL
Qty: 21 TABLET | Refills: 0 | Status: SHIPPED | OUTPATIENT
Start: 2023-04-26

## 2023-04-26 ASSESSMENT — ENCOUNTER SYMPTOMS
WHEEZING: 0
BACK PAIN: 1
LIGHT-HEADEDNESS: 0
CHILLS: 0
PSYCHIATRIC NEGATIVE: 1
COUGH: 0
SHORTNESS OF BREATH: 0
PALPITATIONS: 0
MYALGIAS: 0
FEVER: 0
NECK PAIN: 0
DIZZINESS: 0

## 2023-04-26 ASSESSMENT — PAIN SCALES - GENERAL: PAINLEVEL: MILD PAIN (3)

## 2023-04-26 NOTE — PROGRESS NOTES
"  Assessment & Plan   Problem List Items Addressed This Visit    None  Visit Diagnoses     Acute bilateral low back pain with left-sided sciatica    -  Primary    Relevant Medications    methylPREDNISolone (MEDROL DOSEPAK) 4 MG tablet therapy pack         Discussed symptoms and concerns at length.  Encouraged to take ibuprofen for relief up to 4 times per day.  Encouraged rest and elevation.  Encouraged to use ice or heat 15 minutes at a time several times per day to decrease pain. Return to clinic in 1-2 weeks as necessary for persistent pain. Return to clinic with any change or worsening of symptoms.   Encourage stretching.  Gave stretching information.  Encouraged completing yoga and walking daily.  Gave warning signs and symptoms.    Prescription drug management         BMI:   Estimated body mass index is 43.67 kg/m  as calculated from the following:    Height as of 4/15/23: 1.854 m (6' 1\").    Weight as of this encounter: 150.1 kg (331 lb).   Weight management plan: Discussed healthy diet and exercise guidelines    See Patient Instructions    No follow-ups on file.    Amber Tobin PA-C  [facility] is a 37 year old, presenting for the following health issues:  Back Pain ( follow up)        4/26/2023    10:48 AM   Additional Questions   Roomed by ascencion   Accompanied by self     Back Pain   Pertinent negatives include no chest pain and no fever.   History of Present Illness       Back Pain:  He presents for follow up of back pain. Patient's back pain is a recurring problem.  Location of back pain:  Right lower back and left lower back  Description of back pain: sharp  Back pain spreads: left knee    Since patient first noticed back pain, pain is: always present, but gets better and worse  Does back pain interfere with his job:  No      He eats 2-3 servings of fruits and vegetables daily.He consumes 2 sweetened beverage(s) daily.He exercises with enough effort to " increase his heart rate 60 or more minutes per day.  He exercises with enough effort to increase his heart rate 5 days per week.   He is taking medications regularly.       ED/UC Followup:    Facility:  [facility]  Date of visit: 4/15/23  Reason for visit: low back pain  Current Status: has shooting pain in left knee and back when sitting low, notices he has to urinate after that     Patient is coming today for rapid clinic follow-up.  Was seen on 4/15 for acute back pain. They noticed that one of his discs was slipping.  Over the last week he notices that he gets shooting pain on his left anterior thigh to his knee if he is sitting a little lower.  Symptoms worsen when trying to get up.  Has noticed some mild urinary urgency.  Unsure if this is due to the increased back pain.  Has been using Tylenol and ibuprofen alternating for symptomatic relief.  Has a physical therapy appointment coming up on 6/1/2023.  Currently on light restriction for work.  Feels like the pain is not as severe as previous.  Currently taking the Tylenol and muscle relaxer 3 times daily and ibuprofen twice daily.  Using IcyHot.  No bowel or bladder incontinence.  No recent fall or injury.    Review of Systems   Constitutional: Negative for chills and fever.   Respiratory: Negative for cough, shortness of breath and wheezing.    Cardiovascular: Negative for chest pain and palpitations.   Genitourinary: Negative.    Musculoskeletal: Positive for back pain. Negative for myalgias and neck pain.   Skin: Negative for rash.   Neurological: Negative for dizziness and light-headedness.   Psychiatric/Behavioral: Negative.             Objective    /88 (BP Location: Right arm, Patient Position: Sitting, Cuff Size: Adult Large)   Pulse 67   Temp 97.6  F (36.4  C) (Tympanic)   Resp 22   Wt (!) 150.1 kg (331 lb)   SpO2 97%   BMI 43.67 kg/m    Body mass index is 43.67 kg/m .  Physical Exam  Vitals and nursing note reviewed.   Constitutional:        Appearance: Normal appearance.   Musculoskeletal:         General: No swelling or signs of injury. Normal range of motion.      Comments: No spinal pain to palpation.  Left upper lumbar paraspinous muscle pain to palpation.  No SI joint pain to palpation.  Low back pain with bilateral hip flexion and internal rotation.  No bruising or swelling appreciated.  Otherwise unremarkable lower extremity range of motion.   Skin:     General: Skin is warm and dry.   Neurological:      General: No focal deficit present.      Mental Status: He is alert and oriented to person, place, and time.      Gait: Gait normal.   Psychiatric:         Mood and Affect: Mood normal.         Behavior: Behavior normal.

## 2023-04-29 ENCOUNTER — HEALTH MAINTENANCE LETTER (OUTPATIENT)
Age: 38
End: 2023-04-29

## 2023-06-01 ENCOUNTER — THERAPY VISIT (OUTPATIENT)
Dept: PHYSICAL THERAPY | Facility: OTHER | Age: 38
End: 2023-06-01
Attending: NURSE PRACTITIONER
Payer: COMMERCIAL

## 2023-06-01 DIAGNOSIS — M54.50 ACUTE BILATERAL LOW BACK PAIN WITHOUT SCIATICA: ICD-10-CM

## 2023-06-01 DIAGNOSIS — M43.10 PARS DEFECT WITH SPONDYLOLISTHESIS: ICD-10-CM

## 2023-06-01 PROCEDURE — 97110 THERAPEUTIC EXERCISES: CPT | Mod: GP,PO

## 2023-06-01 PROCEDURE — 97161 PT EVAL LOW COMPLEX 20 MIN: CPT | Mod: GP,PO

## 2023-06-01 NOTE — PROGRESS NOTES
PHYSICAL THERAPY EVALUATION  Type of Visit: Evaluation    See electronic medical record for Abuse and Falls Screening details.    Subjective      Presenting condition or subjective complaint: Back pain   Patient reports low back pain began on 4/14/2023.  He reports he woke up with increased back pain but cannot recall any specific mechanism of injury that would have caused this increase in pain.  Patient reports pain has improved past couple weeks.  Patient denies any radicular symptoms at this time.  At the initial onset of symptoms patient did note an increased frequency and need to void but this has essentially resolved at this time.  Patient states he has modified activities such as not sitting on the floor with his children and not sitting in low chairs which is also seem to help decrease his symptoms.  He would like to return to these prior level of activities once symptoms improve.    Date of onset: 04/14/23    Relevant medical history:   Reviewed in medical chart.  Dates & types of surgery:      Prior diagnostic imaging/testing results: X-ray see medical record   Prior therapy history for the same diagnosis, illness or injury: No      Prior Level of Function   Independent with transfers and ambulation.  Patient works at [employer] in the [employer].  He has been able to continue with his current work duties.    Living Environment  Social support: With family members   Type of home: 1 level   Stairs to enter the home: Yes 4 Is there a railing: Yes   Ramp: No   Stairs inside the home: No       Help at home: None  Equipment owned:       Employment: Yes    Hobbies/Interests:      Patient goals for therapy: get onto floor to play with kids         Objective   LUMBAR SPINE EVALUATION  PAIN: Pain Level at Rest: 1/10  Pain Level with Use: 8/10  Pain Location: lumbar spine  Pain Quality: Aching and Sharp  Pain Frequency: intermittent  Pain is Exacerbated By: Sitting, lifting, carrying, certain positions,  bending, sitting on the floor.  Pain is Relieved By: cold and heat  Pain Progression: Improved    POSTURE: Rounded shoulder posture in sitting.  Patient is able to correct posture with minimal cueing.    GAIT: Normal gait pattern noted       ROM: Lumbar flexion: Fingertips to ankles with pain reported across the low back.    Lumbar extension: 30 degrees with reports of tightness across low back.    MYOTOMES:    Left Right   T12-L3 (Hip Flexion) 5 5   L2-4 (Quads)  5 5   L4 (Ankle DF) 5 5   L5 (Great Toe Ext) 5 5   S1 (Toe Raise) 5 5       NEURAL TENSION: Slump test and SLR test negative bilaterally  FLEXIBILITY: Decreased hamstring flexibility bilaterally, decreased hip flexor flexibility bilaterally    PALPATION: Increased tightness noted over the left QL.    Assessment & Plan   CLINICAL IMPRESSIONS   Medical Diagnosis: Acute bilateral low back pain without sciatica (M54.50)     Pars defect with spondylolisthesis (M43.10)    Treatment Diagnosis: Acute bilateral low back pain without sciatica (M54.50)     Pars defect with spondylolisthesis (M43.10) resulting in low back pain, impaired range of motion, core weakness   Impression/Assessment: Patient is a 37 year old male with low back pain complaints.  The following significant findings have been identified: Pain, Decreased ROM/flexibility, Decreased joint mobility, Decreased strength and Decreased activity tolerance. These impairments interfere with their ability to perform work tasks, recreational activities and household chores as compared to previous level of function.     Clinical Decision Making (Complexity):   Clinical Presentation: Stable/Uncomplicated  Clinical Presentation Rationale: based on medical and personal factors listed in PT evaluation  Clinical Decision Making (Complexity): Low complexity    PLAN OF CARE  Treatment Interventions:  Modalities: Cryotherapy  Interventions: Manual Therapy, Neuromuscular Re-education, Therapeutic Exercise    Long Term  Goals     PT Goal 1  Goal Identifier: Low back pain  Goal Description: Patient will report the ability to sit and play on the floor with his children with back pain not exceeding a 1/10 at its worst.  Rationale: to maximize safety and independence with performance of ADLs and functional tasks  Target Date: 07/27/23  PT Goal 2  Goal Identifier: Range of motion  Goal Description: Patient will report the ability to forward flex and make a bed without low back pain.  Rationale: to maximize safety and independence within the home  Target Date: 07/27/23  PT Goal 3  Goal Identifier: Lifting  Goal Description: Patient report the ability to return to prior level of lifting tasks without low back pain.  Rationale: to maximize safety and independence within the home  Target Date: 07/27/23      Frequency of Treatment: 10 visits  Duration of Treatment: 8 weeks      Education Assessment:   Learner/Method: Patient;Listening;Reading;Demonstration;Pictures/Video;No Barriers to Learning    Risks and benefits of evaluation/treatment have been explained.   Patient/Family/caregiver agrees with Plan of Care.     Evaluation Time:     PT Eval, Low Complexity Minutes (89151): 30      Signing Clinician: Javon Ho, ABEBA        [facility]                                                                                   OUTPATIENT PHYSICAL THERAPY      PLAN OF TREATMENT FOR OUTPATIENT REHABILITATION   Patient's Last Name, First Name, SEANCHECO  WenElvis pimentel YOB: 1985   Provider's Name   [facility]   Medical Record No.  [id number]     Onset Date: 04/14/23  Start of Care Date: 06/01/23     Medical Diagnosis:  Acute bilateral low back pain without sciatica (M54.50)     Pars defect with spondylolisthesis (M43.10)      PT Treatment Diagnosis:  Acute bilateral low back pain without sciatica (M54.50)     Pars defect with spondylolisthesis (M43.10) resulting in low back pain,  impaired range of motion, core weakness Plan of Treatment  Frequency/Duration: 10 visits/ 8 weeks    Certification date from 06/01/23 to 07/27/23         See note for plan of treatment details and functional goals     Javon Ho, PT                         I CERTIFY THE NEED FOR THESE SERVICES FURNISHED UNDER        THIS PLAN OF TREATMENT AND WHILE UNDER MY CARE     (Physician attestation of this document indicates review and certification of the therapy plan).                  Referring Provider:  Ermelinda Harding      Initial Assessment  See Epic Evaluation- Start of Care Date: 06/01/23

## 2023-06-06 ENCOUNTER — THERAPY VISIT (OUTPATIENT)
Dept: PHYSICAL THERAPY | Facility: OTHER | Age: 38
End: 2023-06-06
Attending: NURSE PRACTITIONER
Payer: COMMERCIAL

## 2023-06-06 DIAGNOSIS — M54.50 ACUTE BILATERAL LOW BACK PAIN WITHOUT SCIATICA: Primary | ICD-10-CM

## 2023-06-06 DIAGNOSIS — M43.10 PARS DEFECT WITH SPONDYLOLISTHESIS: ICD-10-CM

## 2023-06-06 PROCEDURE — 97110 THERAPEUTIC EXERCISES: CPT | Mod: GP,PO

## 2023-06-09 ENCOUNTER — THERAPY VISIT (OUTPATIENT)
Dept: PHYSICAL THERAPY | Facility: OTHER | Age: 38
End: 2023-06-09
Attending: NURSE PRACTITIONER
Payer: COMMERCIAL

## 2023-06-09 DIAGNOSIS — M54.50 ACUTE BILATERAL LOW BACK PAIN WITHOUT SCIATICA: Primary | ICD-10-CM

## 2023-06-09 DIAGNOSIS — M43.10 PARS DEFECT WITH SPONDYLOLISTHESIS: ICD-10-CM

## 2023-06-09 PROCEDURE — 97110 THERAPEUTIC EXERCISES: CPT | Mod: GP,PO

## 2023-08-10 ENCOUNTER — OFFICE VISIT (OUTPATIENT)
Dept: FAMILY MEDICINE | Facility: OTHER | Age: 38
End: 2023-08-10
Attending: NURSE PRACTITIONER
Payer: COMMERCIAL

## 2023-08-10 VITALS
HEIGHT: 73 IN | OXYGEN SATURATION: 97 % | WEIGHT: 309 LBS | HEART RATE: 89 BPM | TEMPERATURE: 97.5 F | BODY MASS INDEX: 40.95 KG/M2 | DIASTOLIC BLOOD PRESSURE: 62 MMHG | RESPIRATION RATE: 16 BRPM | SYSTOLIC BLOOD PRESSURE: 120 MMHG

## 2023-08-10 DIAGNOSIS — H61.811: Primary | ICD-10-CM

## 2023-08-10 PROCEDURE — G0463 HOSPITAL OUTPT CLINIC VISIT: HCPCS

## 2023-08-10 PROCEDURE — 99213 OFFICE O/P EST LOW 20 MIN: CPT | Performed by: NURSE PRACTITIONER

## 2023-08-10 RX ORDER — OFLOXACIN 3 MG/ML
5 SOLUTION AURICULAR (OTIC) 2 TIMES DAILY
Qty: 3 ML | Refills: 0 | Status: SHIPPED | OUTPATIENT
Start: 2023-08-10 | End: 2023-08-15

## 2023-08-10 ASSESSMENT — ENCOUNTER SYMPTOMS
CONSTITUTIONAL NEGATIVE: 1
GASTROINTESTINAL NEGATIVE: 1
FEVER: 0
VOICE CHANGE: 0
SHORTNESS OF BREATH: 0
NEUROLOGICAL NEGATIVE: 1
RESPIRATORY NEGATIVE: 1
FATIGUE: 0
COUGH: 0
CARDIOVASCULAR NEGATIVE: 1
APPETITE CHANGE: 0
ACTIVITY CHANGE: 0
SORE THROAT: 0

## 2023-08-10 ASSESSMENT — PAIN SCALES - GENERAL: PAINLEVEL: MILD PAIN (2)

## 2023-08-10 NOTE — NURSING NOTE
"Chief Complaint   Patient presents with    Ear Problem     X 2 days     Not treating with otc at this time.    Initial /62 (BP Location: Right arm, Patient Position: Sitting, Cuff Size: Adult Large)   Pulse 89   Temp 97.5  F (36.4  C) (Tympanic)   Resp 16   Ht 1.854 m (6' 1\")   Wt 140.2 kg (309 lb)   SpO2 97%   BMI 40.77 kg/m   Estimated body mass index is 40.77 kg/m  as calculated from the following:    Height as of this encounter: 1.854 m (6' 1\").    Weight as of this encounter: 140.2 kg (309 lb).     Advance Care Directive on file? no    FOOD SECURITY SCREENING QUESTIONS:    The next two questions are to help us understand your food security.  If you are feeling you need any assistance in this area, we have resources available to support you today.    Hunger Vital Signs:  Within the past 12 months we worried whether our food would run out before we got money to buy more. Never  Within the past 12 months the food we bought just didn't last and we didn't have money to get more. Never  Maryana Mariano LPN,RENEE on 8/10/2023 at 9:30 AM      Maryana Mariano LPN     "

## 2023-08-10 NOTE — PROGRESS NOTES
Elvis Carver  1985    ASSESSMENT/PLAN:   1. External ear canal exostosis, right  - ofloxacin (FLOXIN) 0.3 % otic solution; Place 5 drops into the right ear 2 times daily for 5 days  Dispense: 3 mL; Refill: 0    Right external ear excoriation and minimal bloody drainage.  No foreign body.  No impacted cerumen.  Right TM not erythematous, bulging, without effusion or rupture.  Some discomfort.  He has been afebrile without any other URI symptoms.  Vital signs are stable.  Reviewed with patient this does not appear to be a middle ear infection, more external irritation.  Commend treating with ofloxacin otic solution twice daily for 5 days.  He may use Tylenol as needed for discomfort.  Also discussed the use of a daily nondrowsy histamine once daily for the next 5 days to help with any pressure or muffled sounds.    Patient agrees with plan of care and verbalizes understating. AVS printed. Patient education provided verbally and written instructions provided as requested. Patient made aware of emergent sings and symptoms to monitor for and when to seek additional care/follow up.     SUBJECTIVE:   CHIEF COMPLAINT/ REASON FOR VISIT  Patient presents with:  Ear Problem: X 2 days     HISTORY OF PRESENT ILLNESS  Elvis Cavrer is a pleasant 37 year old male presents to [facility] today with concerns of right ear pain.  Patient is couple days ago he noticed some fullness and muffled sounds in his ear.  Yesterday while he was cleaning his ears after the shower he noticed some blood on his Q-tip.  He states it did not go too deep or cause any trauma.  Throughout the day yesterday he did have some ringing in his ear.  He is having some mild discomfort, but would not describe this as pain.  When he was lying on his right ear last night, he did have some discomfort.  No fever, chills or bodyaches.  Over the past couple weeks he does not had any upper respiratory symptoms.    I have reviewed the nursing notes.  I have reviewed  "allergies, medication list, problem list, and past medical history.    REVIEW OF SYSTEMS  Review of Systems   Constitutional: Negative.  Negative for activity change, appetite change, fatigue and fever.   HENT:  Positive for ear discharge and ear pain. Negative for congestion, sore throat and voice change.    Respiratory: Negative.  Negative for cough and shortness of breath.    Cardiovascular: Negative.  Negative for chest pain.   Gastrointestinal: Negative.    Skin: Negative.  Negative for rash.   Neurological: Negative.    All other systems reviewed and are negative.       VITAL SIGNS  Vitals:    08/10/23 0929   BP: 120/62   BP Location: Right arm   Patient Position: Sitting   Cuff Size: Adult Large   Pulse: 89   Resp: 16   Temp: 97.5  F (36.4  C)   TempSrc: Tympanic   SpO2: 97%   Weight: 140.2 kg (309 lb)   Height: 1.854 m (6' 1\")      Body mass index is 40.77 kg/m .    OBJECTIVE:   PHYSICAL EXAM  Physical Exam  Vitals reviewed.   Constitutional:       Appearance: Normal appearance. He is not ill-appearing or toxic-appearing.   HENT:      Head: Normocephalic and atraumatic.      Right Ear: Ear canal normal. Drainage present. No tenderness. No middle ear effusion. Tympanic membrane is not erythematous, retracted or bulging.      Left Ear: Tympanic membrane, ear canal and external ear normal. No drainage.      Ears:      Comments: Erythema, minimal bloody drainage of external right ear canal from approximately 3:00 to 7:00.  Right TM nonerythematous, bulging and without effusion.  No pain on exam.  No sign of TM rupture, or drainage.    Left TM completely within normal limits.     Nose: Nose normal. No congestion or rhinorrhea.      Right Nostril: No foreign body.      Left Nostril: No foreign body.      Right Turbinates: Not enlarged or swollen.      Left Turbinates: Not enlarged or swollen.      Mouth/Throat:      Pharynx: No oropharyngeal exudate or posterior oropharyngeal erythema.   Eyes:      General: Lids " are normal. Lids are everted, no foreign bodies appreciated.         Right eye: No foreign body or discharge.         Left eye: No foreign body or discharge.      Conjunctiva/sclera: Conjunctivae normal.   Cardiovascular:      Rate and Rhythm: Normal rate and regular rhythm.      Pulses: Normal pulses.      Heart sounds: Normal heart sounds.   Pulmonary:      Effort: Pulmonary effort is normal.      Breath sounds: Normal breath sounds. No wheezing.   Musculoskeletal:      Cervical back: Neck supple. No tenderness.   Lymphadenopathy:      Cervical: No cervical adenopathy.   Skin:     Capillary Refill: Capillary refill takes less than 2 seconds.      Findings: No rash.   Neurological:      General: No focal deficit present.      Mental Status: He is alert and oriented to person, place, and time.   Psychiatric:         Mood and Affect: Mood normal.         Behavior: Behavior normal.         Thought Content: Thought content normal.         Judgment: Judgment normal.        DIAGNOSTICS  No results found for any visits on 08/10/23.     Brenna Morelos NP  [facility]

## 2023-08-17 NOTE — PROGRESS NOTES
06/09/23 1028   Appointment Info   Signing clinician's name / credentials Javon Kristencarlos, PT, OCS   Visits Used 3   Medical Diagnosis Acute bilateral low back pain without sciatica (M54.50)     Pars defect with spondylolisthesis (M43.10)   PT Tx Diagnosis Acute bilateral low back pain without sciatica (M54.50)     Pars defect with spondylolisthesis (M43.10) resulting in low back pain, impaired range of motion, core weakness   Progress Note/Certification   Start of Care Date 06/01/23   Onset of illness/injury or Date of Surgery 04/14/23   Therapy Frequency 10 visits   Predicted Duration 8 weeks   Certification date from 06/01/23   Certification date to 07/27/23   GOALS   PT Goals 2;3   PT Goal 1   Goal Identifier Low back pain   Goal Description Patient will report the ability to sit and play on the floor with his children with back pain not exceeding a 1/10 at its worst.   Rationale to maximize safety and independence with performance of ADLs and functional tasks   Goal Progress Progressing towards goal at time of final visit.   Target Date 07/27/23   PT Goal 2   Goal Identifier Range of motion   Goal Description Patient will report the ability to forward flex and make a bed without low back pain.   Rationale to maximize safety and independence within the home   Goal Progress Progressing towards goal at time of final visit.   Target Date 07/27/23   PT Goal 3   Goal Identifier Lifting   Goal Description Patient report the ability to return to prior level of lifting tasks without low back pain.   Rationale to maximize safety and independence within the home   Goal Progress Progressing towards goal at time of final visit.   Target Date 07/27/23   Subjective Report   Subjective Report Patient reports current home exercise program is going well..  He reports his low back continues to improve overall.  He feels ready to proceed with the trial of independent home program and symptom management techniques at this time.  "  Treatment Interventions (PT)   Interventions Therapeutic Procedure/Exercise   Therapeutic Procedure/Exercise   Therapeutic Procedures: strength, endurance, ROM, flexibillity minutes (39327) 40   Ther Proc 1 - Details SciFit x 5 minutes, seat 21, arms 9, level 5.   Supine single knee-to-chest 3 x 30 seconds bilaterally.  Lumbar rotation x5 reps bilaterally hold 20 seconds.  Supine PPT x10, 5-second hold.  Bridge x 10, 5\" hold.  Supine hip flexor stretch in Issac position 3 x 30 seconds.  Added: Supine PPT with bilateral arm raise using red and green band x 10 each.  Supine opposite arm and leg x 10. -Perform exercises 1-2 times a day or as symptoms allow.   Skilled Intervention Decrease pain, improve range of motion, improve strength   Patient Response/Progress Patient verbalized and demonstrated understanding of home exercise program.   Education   Learner/Method Patient;Listening;Reading;Demonstration;Pictures/Video;No Barriers to Learning   Plan   Home program Single knee-to-chest 3 x 30 seconds bilaterally.  Lumbar rotation x5 reps with holding 20 seconds.  Supine PPT x10, 5-second hold.  Perform exercises 1-2 times a day or as symptoms allow.  Updated 6/6:Bridge x 10, 5\" hold.  Supine hip flexor stretch in Issac position 3 x 30 seconds.-Perform exercises 1-2 times a day or as symptoms allow.  Updated 6/9/23: Supine PPT with bilateral arm raise using red and green band x 10 each.  Supine opposite arm and leg x 10. -Perform exercises 1-2 times a day or as symptoms allow.   Plan for next session Progress exercises as symptoms allow.  Manual therapy modalities as indicated.   Total Session Time   Timed Code Treatment Minutes 40   Total Treatment Time (sum of timed and untimed services) 40         DISCHARGE  Reason for Discharge: Patient developed the ability to proceed with independent home program and symptom management techniques after final visit.  No additional PT appointments were scheduled.      Discharge " Plan: Patient to continue home program.    Referring Provider:  Ermelinda Harding

## 2023-10-25 ENCOUNTER — APPOINTMENT (OUTPATIENT)
Dept: FAMILY MEDICINE | Facility: OTHER | Age: 38
End: 2023-10-25
Attending: CHIROPRACTOR

## 2023-10-25 PROCEDURE — 999N001196 HC STATISTIC UA W/ REFLEX TO MICRO GICH: Mod: ZL | Performed by: CHIROPRACTOR

## 2023-10-25 PROCEDURE — 99499 UNLISTED E&M SERVICE: CPT | Performed by: CHIROPRACTOR

## 2024-07-07 ENCOUNTER — HEALTH MAINTENANCE LETTER (OUTPATIENT)
Age: 39
End: 2024-07-07

## 2025-02-19 ENCOUNTER — OFFICE VISIT (OUTPATIENT)
Dept: FAMILY MEDICINE | Facility: OTHER | Age: 40
End: 2025-02-19
Attending: NURSE PRACTITIONER
Payer: COMMERCIAL

## 2025-02-19 VITALS
HEIGHT: 73 IN | RESPIRATION RATE: 19 BRPM | HEART RATE: 68 BPM | OXYGEN SATURATION: 98 % | BODY MASS INDEX: 38.97 KG/M2 | TEMPERATURE: 99 F | SYSTOLIC BLOOD PRESSURE: 132 MMHG | WEIGHT: 294 LBS | DIASTOLIC BLOOD PRESSURE: 70 MMHG

## 2025-02-19 DIAGNOSIS — J01.40 ACUTE NON-RECURRENT PANSINUSITIS: Primary | ICD-10-CM

## 2025-02-19 DIAGNOSIS — H65.193 ACUTE EFFUSION OF BOTH MIDDLE EARS: ICD-10-CM

## 2025-02-19 ASSESSMENT — PAIN SCALES - GENERAL: PAINLEVEL_OUTOF10: NO PAIN (0)

## 2025-02-19 NOTE — PROGRESS NOTES
"  Assessment & Plan     (J01.40) Acute non-recurrent pansinusitis  (primary encounter diagnosis)    Comment: Sinusitis.  Recent influenza.  Continues to have pressure and pain.  Ears with pressure and pain.  Appears more similar to effusion rather than infection of the ears at this time.  No cerumen impactions.    Plan: amoxicillin-clavulanate (AUGMENTIN) 875-125 MG         tablet          Plan to treat with Augmentin twice a day for 1 week.  Continue over-the-counter management.  Follow-up with PCP for any persisting symptoms.  Return to [facility] clinic or ER for any worsening or changing symptoms.  He is comfortable and agreeable with this plan.    (H65.193) Acute effusion of both middle ears  Comment: Bilateral effusion.  Plan: Discussed eustachian tube dysfunction.  This should self resolve.  If not, follow-up with PCP.      Jessie Leal is a 39 year old, presenting for the following health issues:  Otalgia (Right/)    HPI     Patient presents today with concerns of right ear pain.  He states it feels like a \"pressure sensation\".  It also feels like it is \"popping\".  He did try using a Q-tip yesterday to clean out the outer ear which really made no difference.  He notes it is somewhat painful when he lays down.  He notes he did have influenza a couple weeks ago, continues to have some mild congestion.      Review of Systems  Constitutional, HEENT, cardiovascular, pulmonary, gi and gu systems are negative, except as otherwise noted.        Objective    /70   Pulse 68   Temp 99  F (37.2  C) (Tympanic)   Resp 19   Ht 1.854 m (6' 1\")   Wt 133.4 kg (294 lb)   SpO2 98%   BMI 38.79 kg/m    Body mass index is 38.79 kg/m .    Physical Exam   GENERAL: alert and no distress  EYES: Eyes grossly normal to inspection, PERRL and conjunctivae and sclerae normal  HENT: maxillary sinuses tender bilaterally, ear canals clear, TMs with slight bulging, serous fluid, slight erythema of the bony landmarks bilaterally, " nose and mouth without ulcers or lesions  NECK: no adenopathy, no asymmetry, masses, or scars  RESP: lungs clear to auscultation - no rales, rhonchi or wheezes  CV: regular rate and rhythm, normal S1 S2, no S3 or S4, no murmur, click or rub, no peripheral edema  MS: no gross musculoskeletal defects noted, no edema          Signed Electronically by: Amber Parra PA-C

## 2025-02-19 NOTE — NURSING NOTE
"Chief Complaint   Patient presents with    Otalgia     Right       Patient here for complaint of right ear pain in the afternoons where he feels like he is under water.     Initial /70   Pulse 68   Temp 99  F (37.2  C) (Tympanic)   Resp 19   Ht 1.854 m (6' 1\")   Wt 133.4 kg (294 lb)   SpO2 98%   BMI 38.79 kg/m   Estimated body mass index is 38.79 kg/m  as calculated from the following:    Height as of this encounter: 1.854 m (6' 1\").    Weight as of this encounter: 133.4 kg (294 lb).  Medication Review: complete    The next two questions are to help us understand your food security.  If you are feeling you need any assistance in this area, we have resources available to support you today.          2/19/2025   SDOH- Food Insecurity   Within the past 12 months, did you worry that your food would run out before you got money to buy more? N   Within the past 12 months, did the food you bought just not last and you didn t have money to get more? N         Health Care Directive:  Patient does not have a Health Care Directive: Discussed advance care planning with patient; however, patient declined at this time.    Amanda Polanco LPN      "

## 2025-02-19 NOTE — PATIENT INSTRUCTIONS
Sinus Infection    Augmentin twice a day for one week.    Sudafed for congestion.    Fluids.     Rest.    Follow up with PCP for persisting symptoms.  Return to [facility] clinic/ER for worsening symptoms.

## 2025-07-13 ENCOUNTER — HEALTH MAINTENANCE LETTER (OUTPATIENT)
Age: 40
End: 2025-07-13

## (undated) RX ORDER — KETOROLAC TROMETHAMINE 30 MG/ML
INJECTION, SOLUTION INTRAMUSCULAR; INTRAVENOUS
Status: DISPENSED
Start: 2023-04-15